# Patient Record
Sex: MALE | Race: WHITE | NOT HISPANIC OR LATINO | URBAN - METROPOLITAN AREA
[De-identification: names, ages, dates, MRNs, and addresses within clinical notes are randomized per-mention and may not be internally consistent; named-entity substitution may affect disease eponyms.]

---

## 2019-02-04 ENCOUNTER — COMMUNICATION - HEALTHEAST (OUTPATIENT)
Dept: SCHEDULING | Facility: CLINIC | Age: 74
End: 2019-02-04

## 2019-02-07 ASSESSMENT — MIFFLIN-ST. JEOR: SCORE: 1504.22

## 2019-02-09 ENCOUNTER — RECORDS - HEALTHEAST (OUTPATIENT)
Dept: ADMINISTRATIVE | Facility: OTHER | Age: 74
End: 2019-02-09

## 2019-02-13 ENCOUNTER — SURGERY - HEALTHEAST (OUTPATIENT)
Dept: SURGERY | Facility: CLINIC | Age: 74
End: 2019-02-13

## 2019-02-13 ENCOUNTER — ANESTHESIA - HEALTHEAST (OUTPATIENT)
Dept: SURGERY | Facility: CLINIC | Age: 74
End: 2019-02-13

## 2019-02-13 ASSESSMENT — MIFFLIN-ST. JEOR: SCORE: 1448.43

## 2021-06-02 VITALS — WEIGHT: 167.7 LBS | HEIGHT: 66 IN | BODY MASS INDEX: 26.95 KG/M2

## 2021-06-23 NOTE — TELEPHONE ENCOUNTER
Triage call: no consent to communicate on file, general triage information only, no PCP unable to route     Seen Saturday in the ED, wondering if the ortho surgery will call patient or if he needed to call. Patient called earlier and left a message but hadn't heard back. After reviewing paperwork, Advised that Orthopedics will call them and son will relay that information to his father.     Tati Duffy RN BSBA [facility] Triage/Med Refill 2/4/2019 4:23 PM    Reason for Disposition    Information only question and nurse able to answer    Protocols used: NO PROTOCOL AVAILABLE - INFORMATION ONLY-A-[place]

## 2021-06-24 NOTE — ANESTHESIA CARE TRANSFER NOTE
Last vitals:   Vitals:    02/13/19 1730   BP: 128/62   Pulse: 66   Resp: 16   Temp: 36.7  C (98  F)   SpO2: 100%     Patient's level of consciousness is drowsy  Spontaneous respirations: yes  Maintains airway independently: yes  Dentition unchanged: yes  Oropharynx: oropharynx clear of all foreign objects    QCDR Measures:  ASA# 20 - Surgical Safety Checklist: WHO surgical safety checklist completed prior to induction    PQRS# 430 - Adult PONV Prevention: 4558F - Pt received => 2 anti-emetic agents (different classes) preop & intraop  ASA# 8 - Peds PONV Prevention: NA - Not pediatric patient, not GA or 2 or more risk factors NOT present  PQRS# 424 - Nella-op Temp Management: 4559F - At least one body temp DOCUMENTED => 35.5C or 95.9F within required timeframe  PQRS# 426 - PACU Transfer Protocol: - Transfer of care checklist used  ASA# 14 - Acute Post-op Pain: ASA14B - Patient did NOT experience pain >= 7 out of 10

## 2021-06-24 NOTE — ANESTHESIA PROCEDURE NOTES
Peripheral Block    Patient location during procedure: pre-op  Start time: 2/13/2019 12:27 PM  End time: 2/13/2019 12:33 PM  post-op analgesia per surgeon order as noted in medical record  Staffing:  Performing  Anesthesiologist: Cleve Huang MD  Preanesthetic Checklist  Completed: patient identified, site marked, risks, benefits, and alternatives discussed, timeout performed, consent obtained, at patient's request, airway assessed, oxygen available, suction available, emergency drugs available and hand hygiene performed  Peripheral Block  Block type: brachial plexus, supraclavicular  Prep: ChloraPrep  Patient position: supine  Patient monitoring: blood pressure, heart rate, continuous pulse oximetry and cardiac monitor  Laterality: left  Injection technique: ultrasound guided    Ultrasound used to visualize needle placement in proximity to nerve being blocked: yes   Permanent ultrasound image captured for medical record  Sterile gel and probe cover used for ultrasound.    Needle  Needle type: Stimuplex   Needle gauge: 20G  Needle length: 4 in  no peripheral nerve catheter placed  Assessment  Injection assessment: no difficulty with injection, negative aspiration for heme, no paresthesia on injection and incremental injection

## 2021-06-24 NOTE — ANESTHESIA POSTPROCEDURE EVALUATION
Patient: Alonzo Rojas  LEFT DISTAL HUMERUS OPEN REDUCTION INTERNAL FIXATION  Anesthesia type: general    Patient location: PACU  Last vitals:   Vitals:    02/13/19 1750   BP: 106/63   Pulse: 70   Resp: 13   Temp: 36.4  C (97.6  F)   SpO2: 97%     Post vital signs: stable  Level of consciousness: awake and responds to simple questions  Post-anesthesia pain: pain controlled  Post-anesthesia nausea and vomiting: no  Pulmonary: unassisted, return to baseline  Cardiovascular: stable and blood pressure at baseline  Hydration: adequate  Anesthetic events: no    QCDR Measures:  ASA# 11 - Nella-op Cardiac Arrest: ASA11B - Patient did NOT experience unanticipated cardiac arrest  ASA# 12 - Nella-op Mortality Rate: ASA12B - Patient did NOT die  ASA# 13 - PACU Re-Intubation Rate: ASA13B - Patient did NOT require a new airway mgmt  ASA# 10 - Composite Anes Safety: ASA10A - No serious adverse event       Additional Notes:

## 2021-06-24 NOTE — ANESTHESIA PREPROCEDURE EVALUATION
Anesthesia Evaluation      History of anesthetic complications     Airway   Mallampati: II  Neck ROM: full   Pulmonary - normal exam   (+) a smoker                         Cardiovascular   Exercise tolerance: > or = 4 METS  (+) hypertension well controlled, ,     ECG reviewed (1 degree AV block)  Rhythm: regular  Rate: normal,         Neuro/Psych - negative ROS     Comments: Small vessel lacunar infarcts. No obvious CVA    Endo/Other - negative ROS      GI/Hepatic/Renal - negative ROS           Dental - normal exam   (+) lower dentures and upper dentures                       Anesthesia Plan  Planned anesthetic: general endotracheal  GETA with Supraclavicular nerve block PSR for POPC. Risk of bleeding, infection, nerve damage failure and last   ASA 2     Anesthetic plan and risks discussed with: patient and child/children  Anesthesia plan special considerations: antiemetics,   Post-op plan: routine recovery